# Patient Record
(demographics unavailable — no encounter records)

---

## 2024-10-23 NOTE — PHYSICAL EXAM
[Alert] : alert [Normal Uvula] : normal uvula [Normal] : not short and no webbing [Pectus Deformity] : pectus deformity [Bifid Uvula] : no bifid uvula [Cleft Lip] : no cleft lip [Scoliosis] : no scoliosis [Tremor] : no tremor [de-identified] : tall stature [de-identified] : long face [de-identified] : Rt eye partial blindness [de-identified] : high palate  [FreeTextEntry1] : 5 [FreeTextEntry2] : 194 [FreeTextEntry3] : 202 [FreeTextEntry4] : 1.04 [FreeTextEntry5] : 89 [FreeTextEntry6] : 105 [FreeTextEntry7] : 0.85 [TWNoteComboBox1] : 0 [TWNoteComboBox2] : 0 [TWNoteComboBox3] : 2 [TWNoteComboBox4] : 0 [TWNoteComboBox6] : 1 [TWNoteComboBox7] : 0 [de-identified] : 0 [de-identified] : 1 [de-identified] : 1 [Right] : Right: N [Left] : Left: N [] : No

## 2024-10-23 NOTE — HISTORY OF PRESENT ILLNESS
[Home] : at home, [unfilled] , at the time of the visit. [Medical Office: ([facility])___] : at the medical office located in  [Spouse] : spouse [Verbal consent obtained from patient] : the patient, [unfilled] [FreeTextEntry1] : ANKUSH SIMTH is a 65 yo M PMH retinal tear with resultant glaucoma, abdominal aortic aneurysm,  + arachnodactyly + flat feet + myopia tall stature Pectus carinatum  + hernia    hx spinal cord stimulator  he underwent genetic testing and today presents for results

## 2024-10-23 NOTE — HISTORY OF PRESENT ILLNESS
[Home] : at home, [unfilled] , at the time of the visit. [Medical Office: ([facility])___] : at the medical office located in  [Spouse] : spouse [Verbal consent obtained from patient] : the patient, [unfilled] [FreeTextEntry1] : ANKUSH SMITH is a 65 yo M PMH retinal tear with resultant glaucoma, abdominal aortic aneurysm,  + arachnodactyly + flat feet + myopia tall stature Pectus carinatum  + hernia    hx spinal cord stimulator  he underwent genetic testing and today presents for results

## 2024-10-23 NOTE — PLAN
[TextEntry] : See above note for recommended management. A copy of genetic testing results and clinic note will be sent to  the referring cardiologist   or physician We encourage sharing these results with family members  Long-term management and surveillance for patient should be based on the patients clinical treatment as recommended by their cardiologist.   Any changes in cardiac surveillance should be discussed with the patients physician.  We remain available should there be any new information for personal or family history.  If there are any additional questions, please feel free to call the Cardiogenomics program at [facility], Jennifer Ravi MS, KAROLINE or Manuel Vides MD, PhD at [phone] Time spent counseling the patient during the visit was 45 min. I spent 45 minutes on the encounter   Patient seen with Jennifer Ravi MS, CGC, board certified genetic counsellor

## 2024-10-23 NOTE — REASON FOR VISIT
[FreeTextEntry3] : Dear Dr. Piper and Dr. Guadalupe         . I saw your patient ANKUSH SMITH on 10/22/2024 . Please see the note below for the assessment and plan.   ANKUSH SMITH  was seen  for an initial consultation at the Cardiogenomics Program at [facility] on 07/22/2024.   Mr. SMITH was referred by  (Dr. Guadalupe for hereditary cardiac predisposition risk assessment and counseling, due to hx AA

## 2024-10-23 NOTE — PLAN
[TextEntry] : See above note for recommended management. A copy of genetic testing results and clinic note will be sent to  the referring cardiologist   or physician We encourage sharing these results with family members  Long-term management and surveillance for patient should be based on the patients clinical treatment as recommended by their cardiologist.   Any changes in cardiac surveillance should be discussed with the patients physician.  We remain available should there be any new information for personal or family history.  If there are any additional questions, please feel free to call the Cardiogenomics program at [facility], Jennifer Ravi MS, KAROLINE or Manuel Vides MD, PhD at [phone] Time spent counseling the patient during the visit was 45 min. I spent 45 minutes on the encounter   Patient seen with Jennifer Ravi MS, CGC, board certified genetic counsellor

## 2024-10-23 NOTE — FAMILY HISTORY
[FreeTextEntry1] : FamilyHistory_20_twCiteListControlStart FamilyHistory_20_twCiteListControlEnd [id number]  A four-generation family history was constructed and scanned into [employer].  Family history is significant for: father dec colon CA 43 yo    his maternal families originate from [place]  and paternal families originate from [place], [place], [place].  No Ashkenazi [place] ancestry.  Family history was negative for consanguinity   No family history of SIDS    [FreeTextEntry2] : [place]  [FreeTextEntry3] : [place], [place], [place]

## 2024-10-23 NOTE — PHYSICAL EXAM
[Alert] : alert [Normal Uvula] : normal uvula [Normal] : not short and no webbing [Pectus Deformity] : pectus deformity [Bifid Uvula] : no bifid uvula [Cleft Lip] : no cleft lip [Scoliosis] : no scoliosis [Tremor] : no tremor [de-identified] : tall stature [de-identified] : long face [de-identified] : Rt eye partial blindness [de-identified] : high palate  [FreeTextEntry1] : 5 [FreeTextEntry2] : 194 [FreeTextEntry3] : 202 [FreeTextEntry4] : 1.04 [FreeTextEntry5] : 89 [FreeTextEntry6] : 105 [FreeTextEntry7] : 0.85 [TWNoteComboBox1] : 0 [TWNoteComboBox2] : 0 [TWNoteComboBox3] : 2 [TWNoteComboBox4] : 0 [TWNoteComboBox6] : 1 [TWNoteComboBox7] : 0 [de-identified] : 0 [de-identified] : 1 [de-identified] : 1 [Right] : Right: N [Left] : Left: N [] : No

## 2024-10-23 NOTE — DISCUSSION/SUMMARY
[TextEntry] : Heritable Disorders of Connective Tissue (HDCT) Sequencing and Deletion/Duplication Panel Result: Negative No pathogenic, likely pathogenic, or variants of uncertain significance were identified by this analysis.    No known pathogenic variants were identified in any of the 60 genes evaluated.  His clinical evaluation was negative for MFS, LDS and EDS. This genetic result along with clinical evaluation does not support a diagnosis of MFS, LDS and EDS.  the limitations of genetic testing were discussed with ANKUSH SMITH  for him  recommend continued medical care as per his medical team  for his family At this time we recommend all of his  first degree relatives undergo a clinical cardiac evaluation with history, physical exam, EKG and ECHO. If their initial clinical evaluation is normal they should continued to have clinical cardiac evaluation with imaging every 3-5 years (for adults).

## 2024-10-23 NOTE — REASON FOR VISIT
[FreeTextEntry3] : Dear Dr. Piper and Dr. Guadalupe         . I saw your patient ANKUSH SMITH on 10/22/2024 . Please see the note below for the assessment and plan.   ANKUSH SMITH  was seen  for an initial consultation at the Cardiogenomics Program at [facility] on 07/22/2024.   Mr. SMITH was referred by  (Dr. Guadalupe for hereditary cardiac predisposition risk assessment and counseling, due to hx AA

## 2024-10-23 NOTE — SIGNATURES
[TextEntry] : Manuel Vides MD, PhD  Medical Director Program for Cardiac Genetics, Genomics and Precision Medicine Department of Cardiology [facility]  [employer] at [address] [place] [address] Tel: [phone] Fax: [phone]  ([facility] office) [address], 3rd floor (between [address]) [place] [address] (p) [phone] (f) [phone]

## 2024-10-23 NOTE — FAMILY HISTORY
[FreeTextEntry1] : FamilyHistory_20_twCiteListControlStart FamilyHistory_20_twCiteListControlEnd [id number]  A four-generation family history was constructed and scanned into [employer].  Family history is significant for: father dec colon CA 43 yo    his maternal families originate from [place]  and paternal families originate from [place], [place], [place].  No Ashkenazi Mormonism ancestry.  Family history was negative for consanguinity   No family history of SIDS    [FreeTextEntry2] : [place]  [FreeTextEntry3] : [place], [place], [place]

## 2024-10-23 NOTE — SIGNATURES
[TextEntry] : Manuel Vides MD, PhD  Medical Director Program for Cardiac Genetics, Genomics and Precision Medicine Department of Cardiology [facility]  [employer] at [address] [place] [address] Tel: [phone] Fax: [phone]  ([facility] office) [address], 3rd floor (between [address]) [place] [address] (p) [phone] (f) [phone]

## 2024-10-23 NOTE — ASSESSMENT
[TextEntry] : ANKUSH SMITH  is a 64 year M  with a history of  marfanoid habitus, retinal tear with resultant glaucoma, abdominal aortic aneurysm, + arachnodactyly, + flat feet, + myopia,  tall stature , Pectus carinatum + hernia he underwent genetic testing that is negative.  This genetic test result along with his clinical evaluation that was negative for meeting clinical criteria for Marfan syndrome, Loeyz-Diets syndrome and EDS syndrome.   Result: Negative No pathogenic, likely pathogenic, or variants of uncertain significance were identified by this analysis.    No known pathogenic variants were identified in any of the 60 genes evaluated  the limitations of genetic testing were discussed with ANKUSH SMITH  for him  recommend continued medical care as per his medical team  for his family At this time we recommend all of his  first degree relatives undergo a clinical cardiac evaluation with history, physical exam, EKG and ECHO. If their initial clinical evaluation is normal they should continued to have clinical cardiac evaluation with imaging every 3-5 years (for adults).      Genetic knowledge changes rapidly.  We encourage re-contacting the cardiogenomics program at [facility] if there are significant changes in family or personal health, and annually. ANKUSH SMITH expressed understanding of the presented information and satisfaction with having his questions and concerns addressed.

## 2024-10-23 NOTE — ASSESSMENT
[TextEntry] : ANKUSH SMITH  is a 64 year M  with a history of  marfanoid habitus, retinal tear with resultant glaucoma, abdominal aortic aneurysm, + arachnodactyly, + flat feet, + myopia,  tall stature , Pectus carinatum + hernia he underwent genetic testing that is negative.  This genetic test result along with his clinical evaluation that was negative for meeting clinical criteria for Marfan syndrome, Loeyz-Diets syndrome and EDS syndrome.   Result: Negative No pathogenic, likely pathogenic, or variants of uncertain significance were identified by this analysis.    No known pathogenic variants were identified in any of the 60 genes evaluated  the limitations of genetic testing were discussed with ANKUSH SMITH  for him  recommend continued medical care as per his medical team  for his family At this time we recommend all of his  first degree relatives undergo a clinical cardiac evaluation with history, physical exam, EKG and ECHO. If their initial clinical evaluation is normal they should continued to have clinical cardiac evaluation with imaging every 3-5 years (for adults).      Genetic knowledge changes rapidly.  We encourage re-contacting the cardiogenomics program at [facility] if there are significant changes in family or personal health, and annually. ANKUSH SMITH expressed understanding of the presented information and satisfaction with having his questions and concerns addressed.